# Patient Record
Sex: FEMALE | Race: WHITE | NOT HISPANIC OR LATINO | Employment: OTHER | URBAN - METROPOLITAN AREA
[De-identification: names, ages, dates, MRNs, and addresses within clinical notes are randomized per-mention and may not be internally consistent; named-entity substitution may affect disease eponyms.]

---

## 2019-11-27 ENCOUNTER — HOSPITAL ENCOUNTER (OUTPATIENT)
Dept: NEUROLOGY | Facility: CLINIC | Age: 69
Setting detail: THERAPIES SERIES
Discharge: STILL ADMITTED OR ADMINISTRATIVE | End: 2019-11-27
Attending: PSYCHOLOGIST

## 2019-11-27 DIAGNOSIS — F43.23 ADJUSTMENT DISORDER WITH MIXED ANXIETY AND DEPRESSED MOOD: ICD-10-CM

## 2020-01-24 ENCOUNTER — HOSPITAL ENCOUNTER (OUTPATIENT)
Dept: NEUROLOGY | Facility: CLINIC | Age: 70
Setting detail: THERAPIES SERIES
Discharge: STILL ADMITTED OR ADMINISTRATIVE | End: 2020-01-24
Attending: PSYCHOLOGIST

## 2020-01-24 DIAGNOSIS — F43.23 ADJUSTMENT DISORDER WITH MIXED ANXIETY AND DEPRESSED MOOD: ICD-10-CM

## 2020-02-05 ENCOUNTER — HOSPITAL ENCOUNTER (OUTPATIENT)
Dept: NEUROLOGY | Facility: CLINIC | Age: 70
Setting detail: THERAPIES SERIES
Discharge: STILL ADMITTED OR ADMINISTRATIVE | End: 2020-02-05
Attending: PSYCHOLOGIST

## 2020-02-05 DIAGNOSIS — F43.21 ADJUSTMENT DISORDER WITH DEPRESSED MOOD: ICD-10-CM

## 2020-03-10 ENCOUNTER — HOSPITAL ENCOUNTER (OUTPATIENT)
Dept: NEUROLOGY | Facility: CLINIC | Age: 70
Setting detail: THERAPIES SERIES
Discharge: STILL ADMITTED OR ADMINISTRATIVE | End: 2020-03-10
Attending: PSYCHOLOGIST

## 2020-03-10 DIAGNOSIS — F43.21 ADJUSTMENT DISORDER WITH DEPRESSED MOOD: ICD-10-CM

## 2020-03-31 ENCOUNTER — HOSPITAL ENCOUNTER (OUTPATIENT)
Dept: NEUROLOGY | Facility: CLINIC | Age: 70
Setting detail: THERAPIES SERIES
Discharge: STILL ADMITTED OR ADMINISTRATIVE | End: 2020-03-31
Attending: PSYCHOLOGIST

## 2020-03-31 DIAGNOSIS — F43.23 ADJUSTMENT DISORDER WITH MIXED ANXIETY AND DEPRESSED MOOD: ICD-10-CM

## 2020-04-08 ENCOUNTER — HOSPITAL ENCOUNTER (OUTPATIENT)
Dept: NEUROLOGY | Facility: CLINIC | Age: 70
Setting detail: THERAPIES SERIES
Discharge: STILL ADMITTED OR ADMINISTRATIVE | End: 2020-04-08
Attending: PSYCHOLOGIST

## 2020-04-08 DIAGNOSIS — F43.21 ADJUSTMENT DISORDER WITH DEPRESSED MOOD: ICD-10-CM

## 2020-04-22 ENCOUNTER — HOSPITAL ENCOUNTER (OUTPATIENT)
Dept: NEUROLOGY | Facility: CLINIC | Age: 70
Setting detail: THERAPIES SERIES
Discharge: STILL ADMITTED OR ADMINISTRATIVE | End: 2020-04-22
Attending: PSYCHOLOGIST

## 2020-04-22 DIAGNOSIS — F43.21 ADJUSTMENT DISORDER WITH DEPRESSED MOOD: ICD-10-CM

## 2020-05-12 ENCOUNTER — HOSPITAL ENCOUNTER (OUTPATIENT)
Dept: NEUROLOGY | Facility: CLINIC | Age: 70
Setting detail: THERAPIES SERIES
Discharge: STILL ADMITTED OR ADMINISTRATIVE | End: 2020-05-12
Attending: PSYCHOLOGIST

## 2020-05-12 DIAGNOSIS — F43.21 ADJUSTMENT DISORDER WITH DEPRESSED MOOD: ICD-10-CM

## 2020-05-26 ENCOUNTER — HOSPITAL ENCOUNTER (OUTPATIENT)
Dept: NEUROLOGY | Facility: CLINIC | Age: 70
Setting detail: THERAPIES SERIES
Discharge: STILL ADMITTED OR ADMINISTRATIVE | End: 2020-05-26
Attending: PSYCHOLOGIST

## 2020-05-26 DIAGNOSIS — F43.23 ADJUSTMENT DISORDER WITH MIXED ANXIETY AND DEPRESSED MOOD: ICD-10-CM

## 2020-06-09 ENCOUNTER — HOSPITAL ENCOUNTER (OUTPATIENT)
Dept: NEUROLOGY | Facility: CLINIC | Age: 70
Setting detail: THERAPIES SERIES
Discharge: STILL ADMITTED OR ADMINISTRATIVE | End: 2020-06-09
Attending: PSYCHOLOGIST

## 2020-06-09 DIAGNOSIS — F43.23 ADJUSTMENT DISORDER WITH MIXED ANXIETY AND DEPRESSED MOOD: ICD-10-CM

## 2020-06-24 ENCOUNTER — HOSPITAL ENCOUNTER (OUTPATIENT)
Dept: NEUROLOGY | Facility: CLINIC | Age: 70
Setting detail: THERAPIES SERIES
Discharge: STILL ADMITTED OR ADMINISTRATIVE | End: 2020-06-24
Attending: PSYCHOLOGIST

## 2020-06-24 DIAGNOSIS — F43.21 ADJUSTMENT DISORDER WITH DEPRESSED MOOD: ICD-10-CM

## 2020-07-17 ENCOUNTER — HOSPITAL ENCOUNTER (OUTPATIENT)
Dept: NEUROLOGY | Facility: CLINIC | Age: 70
Setting detail: THERAPIES SERIES
Discharge: STILL ADMITTED OR ADMINISTRATIVE | End: 2020-07-17
Attending: PSYCHOLOGIST

## 2020-07-17 DIAGNOSIS — F43.23 ADJUSTMENT DISORDER WITH MIXED ANXIETY AND DEPRESSED MOOD: ICD-10-CM

## 2020-08-07 ENCOUNTER — HOSPITAL ENCOUNTER (OUTPATIENT)
Dept: NEUROLOGY | Facility: CLINIC | Age: 70
Setting detail: THERAPIES SERIES
Discharge: STILL ADMITTED OR ADMINISTRATIVE | End: 2020-08-07
Attending: PSYCHOLOGIST

## 2020-08-07 DIAGNOSIS — F43.23 ADJUSTMENT DISORDER WITH MIXED ANXIETY AND DEPRESSED MOOD: ICD-10-CM

## 2020-08-21 ENCOUNTER — HOSPITAL ENCOUNTER (OUTPATIENT)
Dept: NEUROLOGY | Facility: CLINIC | Age: 70
Setting detail: THERAPIES SERIES
Discharge: STILL ADMITTED OR ADMINISTRATIVE | End: 2020-08-21
Attending: PSYCHOLOGIST

## 2020-08-21 DIAGNOSIS — F43.23 ADJUSTMENT DISORDER WITH MIXED ANXIETY AND DEPRESSED MOOD: ICD-10-CM

## 2020-09-04 ENCOUNTER — HOSPITAL ENCOUNTER (OUTPATIENT)
Dept: NEUROLOGY | Facility: CLINIC | Age: 70
Setting detail: THERAPIES SERIES
Discharge: STILL ADMITTED OR ADMINISTRATIVE | End: 2020-09-04
Attending: PSYCHOLOGIST

## 2020-09-04 DIAGNOSIS — F43.23 ADJUSTMENT DISORDER WITH MIXED ANXIETY AND DEPRESSED MOOD: ICD-10-CM

## 2020-09-18 ENCOUNTER — HOSPITAL ENCOUNTER (OUTPATIENT)
Dept: NEUROLOGY | Facility: CLINIC | Age: 70
Setting detail: THERAPIES SERIES
Discharge: STILL ADMITTED OR ADMINISTRATIVE | End: 2020-09-18
Attending: PSYCHOLOGIST

## 2020-09-18 DIAGNOSIS — F43.23 ADJUSTMENT DISORDER WITH MIXED ANXIETY AND DEPRESSED MOOD: ICD-10-CM

## 2020-10-02 ENCOUNTER — HOSPITAL ENCOUNTER (OUTPATIENT)
Dept: NEUROLOGY | Facility: CLINIC | Age: 70
Setting detail: THERAPIES SERIES
Discharge: STILL ADMITTED OR ADMINISTRATIVE | End: 2020-10-02
Attending: PSYCHOLOGIST

## 2020-10-02 DIAGNOSIS — F43.23 ADJUSTMENT DISORDER WITH MIXED ANXIETY AND DEPRESSED MOOD: ICD-10-CM

## 2020-10-16 ENCOUNTER — HOSPITAL ENCOUNTER (OUTPATIENT)
Dept: NEUROLOGY | Facility: CLINIC | Age: 70
Setting detail: THERAPIES SERIES
Discharge: STILL ADMITTED OR ADMINISTRATIVE | End: 2020-10-16
Attending: PSYCHOLOGIST

## 2020-10-16 DIAGNOSIS — F43.23 ADJUSTMENT DISORDER WITH MIXED ANXIETY AND DEPRESSED MOOD: ICD-10-CM

## 2020-10-30 ENCOUNTER — HOSPITAL ENCOUNTER (OUTPATIENT)
Dept: NEUROLOGY | Facility: CLINIC | Age: 70
Setting detail: THERAPIES SERIES
Discharge: STILL ADMITTED OR ADMINISTRATIVE | End: 2020-10-30
Attending: PSYCHOLOGIST

## 2020-10-30 DIAGNOSIS — F43.23 ADJUSTMENT DISORDER WITH MIXED ANXIETY AND DEPRESSED MOOD: ICD-10-CM

## 2021-06-02 ENCOUNTER — OFFICE VISIT - HEALTHEAST (OUTPATIENT)
Dept: NEUROLOGY | Facility: CLINIC | Age: 71
End: 2021-06-02

## 2021-06-02 DIAGNOSIS — F43.23 ADJUSTMENT DISORDER WITH MIXED ANXIETY AND DEPRESSED MOOD: ICD-10-CM
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
## 2021-06-05 NOTE — PROGRESS NOTES
"Psychology Progress Note    Date: 1/24/2020    Time length and type of treatment: 5861-6079 , individual therapy    Necessity: This session is necessary to address adjustment issues with features of depression secondary to caregiver role for her  with Alzheimer's disease.  We initiate patient's treatment plan today.  She rates her depression on the PHQ-9 depression severity scale as 2 indicating mild depression.  Patient rates her anxiety on the IRINA-7 anxiety severity scale of 0 indicating no anxiety.  Today we focus on the patient's depression treatment plan, specifically exploring processing grief and cognitive messages that exacerbate depression.  The reader is invited to review the patient's full treatment plan in the Media section of the patient's Epic medical record.    Intervention: Patient reports a sense that her  and she are arguing less.  She describes being more accepting that this is an ineffective strategy and that she will not be able to resolve the issues that she experienced during the course of her marriage now that her  has Alzheimer's.    We explore how the patient can balance her needs with her 's needs.  Patient indicates she is more comfortable with addressing some of her needs when her  will be in a day program more consistently.  She reports they have just returned from [place] and will be going to [place] for 3 weeks.  A more consistent program of adult day care will not start until after they get back.    We discussed how the patient might frame her relationship with her  in a way that would be more comfortable such as \"I am making a commitment to this human being to keep him safe.\"  We discussed how exercising ones values around kindness and caring can give meaning to this experience.    This writer utilized motivational interviewing, active listening, reassurance and support in the context of cognitive behavioral therapy and ACT therapy to " address the above.      Mental Status: Orientation to person, place, and time is in tact.  Recent and remote memory, attention, concentration, language, and fund of knowledge are intact.  Mood appears stable with calm affect.  No indication of suicidal ideation, plan, or intent.  No indication of homicidal ideation, plan or intent.    Progress: Patient feels that things are better now that her  and she are arguing less.  Progress is good with deepening acceptance of her situation.    Plan:   Patient will return in 2 weeks where we will continue with cognitive behavioral therapy to promote adaptive coping with her caregiver role for her  with Alzheimer's disease and support as the patient faces difficult decisions regarding her 's care.  Estimated duration of treatment is 4 individual therapy sessions (80126) at 2-week intervals.   it is anticipated that frequency will be reduced to 1 time per month for 4 sessions by 4/1/2024.  Treatment will be completed by 8/1/2020.  Further services are warranted to address caregiver stress which research has indicated is related to development of psychiatric and medical complications.  Patient was in agreement with this plan.  Diagnosis: Adjustment disorder with depressed mood

## 2021-06-06 NOTE — PROGRESS NOTES
Psychology Progress Note    Date: 3/10/2020    Time length and type of treatment: 8366-3470 , individual therapy    Necessity: This session is necessary to address adjustment issues and features of depression secondary to caregiver role for her  with Alzheimer's disease.  Today we focus on the patient's depression treatment plan, specifically exploring processing grief, thoughts and expectations of self and others, and cognitive messages that exacerbate depression.  The reader is invited to review the patient's full treatment plan in the Media section of the patient's Epic medical record.    Intervention: Patient reports being relieved to be back from a trip to [place].  She indicates that many of her expectations were not met.  She reports feeling somewhat more burdened with supervising her .    Patient reflects on the substantial resentment that she feels when her  is critical of her and never thanks her for her assistance.  We discussed how this has been reflected throughout her marriage.  We explore ways the patient might reframe her expectations at this time in order to reduce resentment.  Patient is able to identify phrases that she learned through mindfulness meditation training that might be helpful.    We also discussed resources in the community for her  and herself such as yoga classes and a pottery class for her .  Patient is encouraged to recognize that some activities will be for her, some for her  and some for both.  She is encouraged to recognize that some activities her  may enjoy more than others.    This writer utilized motivational interviewing, active listening, reassurance and support in the context of cognitive behavioral therapy and ACT therapy to address the above.      Mental Status: Orientation to person, place, and time is in tact.  Recent and remote memory, attention, concentration, language, and fund of knowledge are intact.  Mood  appears depressed with flat affect.  No indication of suicidal ideation, plan, or intent.  No indication of homicidal ideation, plan or intent.    Progress: Patient reports feeling somewhat depleted following vacation in warmer climate.  Progress is slow with establishing sufficient activities for her  and herself that are meaningful and represent a weekly routine.    Plan:    Patient will return in 3 weeks where we will continue with cognitive behavioral therapy to promote adaptive coping with her caregiver role for her  with Alzheimer's disease and support as the patient faces difficult decisions regarding her 's care.  Patient reports she would like to invite her two adult children to one session for educational purposes.  Estimated duration of treatment is 4 individual therapy sessions (10157) at 2-week intervals.   it is anticipated that frequency will be reduced to 1 time per month for 4 sessions by 5/1/2020.  Treatment will be completed by 8/1/2020.  Further services are warranted to address caregiver stress which research has indicated is related to development of psychiatric and medical complications.  Patient was in agreement with this plan.  Diagnosis: Adjustment disorder with depressed mood

## 2021-06-07 NOTE — PROGRESS NOTES
Psychology Progress Note    Date: 3/31/2020    Time length and type of treatment: 7071-1671 , individual therapy    Necessity: This session is necessary to address adjustment issues and features of depression secondary to caregiver role for her  with Alzheimer's disease.  Today we focus on the patient's depression treatment plan, specifically exploring thoughts and expectations of self and others.  The reader is invited to review the patient's full treatment plan in the Media section of the patient's Epic medical record. After review of the patient's situation, this visit was changed from an in-person visit to a telephone visit to reduce the risk of COVID 19 exposure.  Patient was informed that policies and procedures that govern in-person sessions would also apply to phone sessions.  Patient was also informed that phone sessions would be discontinued when COVID 19 exposure is no longer a concern (as determined by [facility]).  Patient was in agreement with proceeding with a phone session.    Intervention: Patient reports managing the COVID crisis reasonably well overall.  She describes the challenges in finding ways to structure her 's time.  She describes ongoing challenges with her 's chronic difficult personality features which have been present throughout their marriage.  She reports a variety of restorative activities for herself on a daily basis.    Patient indicates that her  now needs more supervision with bathroom hygiene.  She reports being generally comfortable providing this.  Her 's psychiatrist has encouraged her to begin research to find a [facility].  Patient would rather try more frequent day programming (4 days per week) first when these programs resume.      We discuss how the patient can monitor her feelings and reactions with trying to structure activities for her  before he can begin a day program.  Given that we don't know how long it will be before  day programs resume she is encouraged to do the research and virtual visits with [facility].    Patient reflects on her anxiety about pauline COVID 19 herself.  She reports knowing what symptoms to look for.  This writer encourages her to go on to MY Chart for her healthcare system to see what steps they are recommending for patients who have concerns.  We discussed how having a plan and knowing resources can help with the anxiety.    Patient also describes giving herself permission to take things slower each day e.g. breakfast with coffee and newspaper, lowering expectations for what she accomplishes in a day.    This writer utilized motivational interviewing, active listening, reassurance and support in the context of cognitive behavioral therapy and ACT therapy to address the above.      Mental Status: Orientation to person, place, and time is in tact.  Recent and remote memory, attention, concentration, language, and fund of knowledge are intact.  Mood appears stable with calm affect.  No indication of suicidal ideation, plan, or intent.  No indication of homicidal ideation, plan or intent.    Progress: Patient reports adapting well overall to COVID 19 situation.  Progress is improving with modifying expectations of her  and herself.    Plan: Patient will return in 2 weeks where we will continue with cognitive behavioral therapy to promote adaptive coping with her caregiver role for her  with Alzheimer's disease and support as the patient faces difficult decisions regarding her 's care.  Estimated duration of treatment is 4 individual therapy sessions (32186) at 2 week intervals.  The goal is to work towards an interval of one session per month by 6/1/2020.  Treatment is expected to be completed by 9/1/2020.  Further services are warranted to address caregiver stress which research has indicated is related to development of psychiatric and medical complications.  Patient was in agreement with  this plan.    Diagnosis:  Adjustment disorder with depressed mood.

## 2021-06-07 NOTE — PROGRESS NOTES
Psychology Progress Note    Date: 4/8/2020    Time length and type of treatment: 9624-0520 , individual therapy, phone visit    Necessity: This session is necessary to address adjustment issues with features of depression secondary to caregiver role for her  with Alzheimer's.  Today we focus on the patient's depression treatment plan, specifically exploring thoughts and expectations of self and others.  The reader is invited to review the patient's full treatment plan in the Media section of the patient's Epic medical record.     After review of the patient's situation, this visit was changed from an in-person visit to a phone visit to reduce the risk of COVID 19 exposure.  Patient was informed that policies and procedures that govern in-person sessions would also apply to phone sessions.  Patient was also informed that phone sessions would be discontinued when COVID 19 exposure is no longer a concern (as determined by [facility]).      Patient location: home.  Provider location: private residence ([facility], [facility] outpatient mental health services).      Patient was in agreement with proceeding with a phone session.    Intervention: Patient reports coping as best as she can with the constraints that shelter in place have placed upon her.  She describes taking a brisk walk daily by herself in addition to a slow walk with her .  She reports that this has been so helpful that she is sleeping better at night.      We discussed the reality that adult day programs may not resume until July.  We explored how well the patient would be able to tolerate 24/7 care for her  given the level of supervision that he now needs (brushing teeth, general hygiene, structured activity.)  Patient acknowledges that he was unable to remember that she had gone out for a walk and went to look for her getting lost.  He did find his way back home.  We discuss strategies to help with this  situation.    We also discuss that because of the COVID 19 crisis it may accelerate her plan to place her  in [facility] as undesirable as that it is.  We explore the patient's thoughts and feelings about that.     Patient reports doing a better job of meal planning so that she feels safer that she has adequate supplies.    This writer utilized motivational interviewing, active listening, reassurance and support in the context of cognitive behavioral therapy and ACT therapy to address the above.      Mental Status: Orientation to person, place, and time is in tact.  Recent and remote memory, attention, concentration, language, and fund of knowledge are intact.  Mood appears stable with calm affect.  No indication of suicidal ideation, plan, or intent.  No indication of homicidal ideation, plan or intent.    Progress: Patient reports coping as best as she can with the constraints secondary to COVID 19.  Progress is good with adopting effective self care measures.    Plan: Patient will return in 2 weeks where we will continue with cognitive behavioral therapy to promote adaptive coping with her caregiver role for her  with Alzheimer's disease and support as the patient faces difficult decisions regarding her 's care.  Estimated duration of treatment is 4 individual therapy sessions (11561) at 2 week intervals.  The goal is to work towards an interval of one session per month by 6/1/2020.  Treatment is expected to be completed by 9/1/2020.  Further services are warranted to address caregiver stress which research has indicated is related to development of psychiatric and medical complications.  Patient was in agreement with this plan.    Diagnosis:  Adjustment disorder with depressed mood

## 2021-06-07 NOTE — PROGRESS NOTES
Psychology Progress Note    Date: 4/22/2020    Time length and type of treatment: 7106-4379 , individual therapy, telephone visit    Necessity: This session is necessary to address ajdustment issues with features of depression secondary to caregiver role for her  with Alzheimer's.  Today we focus on the patient's depression treatment plan, specifically exploring thoughts and expectations of self and others.  The reader is invited to review the patient's full treatment plan in the Media section of the patient's Epic medical record.     After review of the patient's situation, this visit was changed from an in-person visit to a telephone visit via to reduce the risk of COVID 19 exposure.  Patient was given the option of a video visit.  Patient declined indicating preference for phone session.  Patient was informed that policies and procedures that govern in-person sessions would also apply to telephone sessions.  Patient was also informed that telephone sessions would be discontinued when COVID 19 exposure is no longer a concern (as determined by [facility]).       Patient distance location: home  Provider distance location: [facility] [place]    Patient was in agreement with proceeding with a telephone session.    Intervention: Patient reports having some challenging days feeling hopelessness.  We discuss how the patient's history of a difficult relationship with her  makes caring for him now especially draining.  Also, the constraints imposed by the pandemic is particularly debilitating.      Patient describes her 's behaviors which are particularly challenging, not taking instruction from her where there are safety concerns.  We explore a variety of strategies for building trust between them - statements of reassurance (consistently and frequently)  that she is not going anywhere and will take care of him; doing portraits of him for  "their 2 children.  Patient finds this discussion helpful.     This writer utilized motivational interviewing, active listening, reassurance and support in the context of cognitive behavioral therapy and ACT therapy to address the above.      Mental Status: Orientation to person, place, and time is in tact.  Recent and remote memory, attention, concentration, language, and fund of knowledge are intact.  Mood appears stable with calm affect.  No indication of suicidal ideation, plan, or intent.  No indication of homicidal ideation, plan or intent.    Progress: Patient reports doing well \"on most days\".  We discuss how she can contact this writer when she feels that she needs to talk sooner.  Progress is good with maintaining stable mood overall.    Plan:   Patient will return in 2 weeks where we will continue with cognitive behavioral therapy to promote adaptive coping with her caregiver role for her  with Alzheimer's disease and support as the patient faces difficult decisions regarding her 's care.  Estimated duration of treatment is 4 individual therapy sessions (14225) at 2 week intervals.  The goal is to work towards an interval of one session per month by 7/1/2020.  Treatment is expected to be completed by 9/1/2020.  Further services are warranted to address caregiver stress which research has indicated is related to development of psychiatric and medical complications.  Patient was in agreement with this plan.    Diagnosis:  Adjustment disorder with depressed mood  "

## 2021-06-08 NOTE — PROGRESS NOTES
"Psychology Progress Note    Date: 6/9/2020     Time length and type of treatment: 5227-4787 , individual therapy, telephone visit    Necessity: This session is necessary to address ajdustment issues with features of depression secondary to caregiver role for her  with Alzheimer's.  Today we focus on the patient's depression treatment plan, specifically exploring thoughts and expectations of self and others and processing grief.  The reader is invited to review the patient's full treatment plan in the Media section of the patient's Epic medical record.    After review of the patient's situation, this visit was changed from an in-person visit to a telephone visit via [employer]  to reduce the risk of COVID 19 exposure.  Patient was given the option of a video visit.  Patient declined indicating preference for phone session.  Patient was informed that policies and procedures that govern in-person sessions would also apply to telephone sessions.  Patient was also informed that telephone sessions would be discontinued when COVID 19 exposure is no longer a concern (as determined by [facility]).       Patient distance location: home  Provider distance location: [facility] [place]     Patient was in agreement with proceeding with a telephone session.      Intervention: Patient reports \"I am probably at my lowest point.\"  She describes making the decision not to purchase a new home and not sell her current home.  We discuss how this had served as a balm for her coping with the 24 hour supervision that her  requires.      We explore ways that the patient might get more breaks going forward knowing that day programs will not be coming back on line anytime soon.  We discuss how the patient might find more activities for her  to do (browse through the [employer]) and ask others to spend consistent time with him on a weekly basis so that " she gets a break.    This writer utilized motivational interviewing, active listening, reassurance and support in the context of cognitive behavioral therapy and ACT therapy to address the above.      Mental Status: Orientation to person, place, and time is in tact.  Recent and remote memory, attention, concentration, language, and fund of knowledge are intact.  Mood appears stable with sad affect.  No indication of suicidal ideation, plan, or intent.  No indication of homicidal ideation, plan or intent.    Progress: Patient reports feeling very discouraged.  Progress is slow with acceptance that the burden of structuring her 's day falls on her and that day programming may not resume.    Plan: Patient will return in 2 weeks where we will continue with cognitive behavioral therapy to promote adaptive coping with her caregiver role for her  with Alzheimer's disease and support as the patient faces difficult decisions regarding her 's care.  Estimated duration of treatment is 3 individual therapy sessions (95013) at 2 week intervals.  The goal is to work towards an interval of one session per month by 7/1/2020.  Treatment is expected to be completed by 9/1/2020.  Further services are warranted to address caregiver stress which research has indicated is related to development of psychiatric and medical complications.  Patient was in agreement with this plan.    Diagnosis:  Adjustment disorder with depressed and anxious mood

## 2021-06-08 NOTE — PROGRESS NOTES
"Psychology Progress Note    Date: 5/12/2020    Time length and type of treatment: 8176-3015 , individual therapy, telephone visit    Necessity: This session is necessary to address ajdustment issues with features of depression secondary to caregiver role for her  with Alzheimer's.  Today we focus on the patient's depression treatment plan, specifically exploring thoughts and expectations of self and others.  The reader is invited to review the patient's full treatment plan in the Media section of the patient's Epic medical record.      After review of the patient's situation, this visit was changed from an in-person visit to a telephone visit via [employer]  to reduce the risk of COVID 19 exposure.  Patient was given the option of a video visit.  Patient declined indicating preference for phone session.  Patient was informed that policies and procedures that govern in-person sessions would also apply to telephone sessions.  Patient was also informed that telephone sessions would be discontinued when COVID 19 exposure is no longer a concern (as determined by [facility]).       Patient distance location: home  Provider distance location: [facility] Neurology [place]/[facility]     Patient was in agreement with proceeding with a telephone session.    Intervention: Patient reports doing the same.  She states, \"I really need adult day care to start again so that he can go 3 days per week.\"  She is pleased that her  will be meeting with a speech and occupational therapist for an assessment.  She expresses the hope that they will have tips for activities that she might do with her .  She is also looking forward [place] with her daughter and grandchildren (who will be in another cabin).  We discuss how these events help the patient tolerate the wait until adult day programs resume.      Patient reports that her 's " medications were increased slightly and seem to be helping with his level of agitation.    This writer utilized motivational interviewing, active listening, reassurance and support in the context of cognitive behavioral therapy and ACT therapy to address the above.      Mental Status: Orientation to person, place, and time is in tact.  Recent and remote memory, attention, concentration, language, and fund of knowledge are intact.  Mood appears stable with calm affect.  No indication of suicidal ideation, plan, or intent.  No indication of homicidal ideation, plan or intent.    Progress: Patient reports coping the best that she can.  Progress is good with maintaining stable mood.    Plan: Patient will return in 2 weeks where we will continue with cognitive behavioral therapy to promote adaptive coping with her caregiver role for her  with Alzheimer's disease and support as the patient faces difficult decisions regarding her 's care.  Estimated duration of treatment is 3 individual therapy sessions (12009) at 2 week intervals.  The goal is to work towards an interval of one session per month by 7/1/2020.  Treatment is expected to be completed by 9/1/2020.  Further services are warranted to address caregiver stress which research has indicated is related to development of psychiatric and medical complications.  Patient was in agreement with this plan.    Diagnosis:  Adjustment disorder with depressed mood

## 2021-06-08 NOTE — PROGRESS NOTES
"Psychology Progress Note    Date: 5/26/2020    Time length and type of treatment: 0988-7747 , individual therapy, telephone visit    Necessity: This session is necessary to address ajdustment issues with features of depression secondary to caregiver role for her  with Alzheimer's.  Today we focus on the patient's depression treatment plan, specifically exploring thoughts and expectations of self and others and processing grief.  The reader is invited to review the patient's full treatment plan in the Media section of the patient's Epic medical record.      After review of the patient's situation, this visit was changed from an in-person visit to a telephone visit via [employer]  to reduce the risk of COVID 19 exposure.  Patient was given the option of a video visit.  Patient declined indicating preference for phone session.  Patient was informed that policies and procedures that govern in-person sessions would also apply to telephone sessions.  Patient was also informed that telephone sessions would be discontinued when COVID 19 exposure is no longer a concern (as determined by [facility]).       Patient distance location: home  Provider distance location: [facility] Neurology [place]/[facility]     Patient was in agreement with proceeding with a telephone session.      Intervention: Patient reports significant disappointment that her trip to a cabin will be cancelled.  She indicates that her daughter did not think that it would be feasible to maintain social distancing.  Patient reflects on her daughter's comment that she is \"high maintenance\".  Patient acknowledges that she may have \"romanticized\" what the vacation would be.  We also explore that the patient has little reserve secondary to grueling nature of her caregiver responsibilities because of her 's behavioral issues.  We discuss how the small things that are daily are the best ways of replenishing.  "     Patient also reports that her stress levels have increased due to a decision to buy a new home and get her current home ready for market.  This writer cautions the patient to be realistic about how much her  will be able to help.  We also revisit ways to structure activities for her  in order to promote pleasant interactions.      This writer utilized motivational interviewing, active listening, reassurance and support in the context of cognitive behavioral therapy and ACT therapy to address the above.      Mental Status: Orientation to person, place, and time is in tact.  Recent and remote memory, attention, concentration, language, and fund of knowledge are intact.  Mood appears anxious with anxious affect.  No indication of suicidal ideation, plan, or intent.  No indication of homicidal ideation, plan or intent.    Progress: Patient reports profound disappointment that a vacation has been cancelled.  Progress is steady with managing expectations of her .    Plan: Patient will return in 2 weeks where we will continue with cognitive behavioral therapy to promote adaptive coping with her caregiver role for her  with Alzheimer's disease and support as the patient faces difficult decisions regarding her 's care.  Estimated duration of treatment is 3 individual therapy sessions (86027) at 2 week intervals.  The goal is to work towards an interval of one session per month by 7/1/2020.  Treatment is expected to be completed by 9/1/2020.  Further services are warranted to address caregiver stress which research has indicated is related to development of psychiatric and medical complications.  Patient was in agreement with this plan.    Diagnosis:  Adjustment disorder with depressed and anxious mood

## 2021-06-09 NOTE — PROGRESS NOTES
"Psychology Progress Note    Date: 7/17/2020    Time length and type of treatment: 4304-2498, individual therapy, telephone visit    Necessity: This session is necessary to address adjustment issues with features of depression secondary to caregiver role for her  with Alzheimer's.  We update the patient's treatment plan today. The patient rates her anxiety on the IRINA-7 as 7 indicating moderate anxiety.  The patient rates her depression on the PHQ-9 as 6 indicating moderate depression. Patient gives verbal consent to her treatment plan which we discuss.  Verbal consent is in lieu of a signature secondary to the nature of a telephone visit.  Today we focus on the patient's depression and anxiety treatment plan, specifically exploring thoughts and expectations of self and others and processing grief.  The reader is invited to review the patient's full treatment plan in the Media section of the patient's Epic medical record.  After review of the patient's situation, this visit was changed from an in-person visit to a telephone visit via [employer]  to reduce the risk of COVID 19 exposure.  Patient was given the option of a video visit.  Patient declined indicating preference for phone session.  Patient was informed that policies and procedures that govern in-person sessions would also apply to telephone sessions.  Patient was also informed that telephone sessions would be discontinued when COVID 19 exposure is no longer a concern (as determined by [facility]).       Patient distance location: home  Provider distance location: [facility] Neurology [place]/[facility]     Patient was in agreement with proceeding with a telephone session.    Intervention: Patient reports feeling more anxiety, resentment and negativity.  \"I feel trapped.\"  We explore what keeps the patient stuck in resentment given that she accepts that her  is not cognitively able to behave any " differently.  Patient reflects on the circumstances in which they met and got together.  She reports she was  when she began an affair with her current .  She describes significant regrets regarding many choices that she made when she was a young adult.  We explore that the patient's resentment and negativity may be towards herself.  We discuss how she might move towards more compassion towards her younger self.  This writer encourages the patient to journal her story so as to work towards developing more compassion and understanding towards herself.  She is in agreement with this plan.    Patient reports that efforts to create separate living spaces are stalled at this time due to changes that would be required.    This writer utilized motivational interviewing, active listening, reassurance and support in the context of cognitive behavioral therapy and ACT therapy to address the above.      Mental Status: Orientation to person, place, and time is in tact.  Recent and remote memory, attention, concentration, language, and fund of knowledge are intact.  Mood appears depressed with tearful affect.  No indication of suicidal ideation, plan, or intent.  No indication of homicidal ideation, plan or intent.    Progress: Patient reports feeling more trapped, resentful, anxious.  Progress is slow with stabilizing mood and deepening acceptance of providing care for her .    Plan: Patient will return in 2 weeks where we will continue with cognitive behavioral therapy to promote adaptive coping with her caregiver role for her  with Alzheimer's disease and support as the patient faces difficult decisions regarding her 's care.  Estimated duration of treatment is 8 individual therapy sessions (19882) at 2 week intervals.  Treatment is expected to be completed by 12/31/2020.  Further services are warranted to address caregiver stress which research has indicated is related to development of  psychiatric and medical complications.  Patient was in agreement with this plan.    Diagnosis:  Adjustment disorder with depressed and anxious mood
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
## 2021-06-10 NOTE — PROGRESS NOTES
Psychology Progress Note    Date: 8/21/2020    Time length and type of treatment: 0673-8814 , individual therapy, telephone visit    Necessity: This session is necessary to address adjustment issues with features of depression secondary to caregiver role for her  with Alzheimer's.  Today we focus on the patient's depression and anxiety treatment plan, specifically exploring thoughts and expectations of self and others and processing grief.  The reader is invited to review the patient's full treatment plan in the Media section of the patient's Epic medical record.    After review of the patient's situation, this visit was changed from an in-person visit to a telephone visit via [employer]  to reduce the risk of COVID 19 exposure.  Patient was given the option of a video visit.  Patient declined indicating preference for phone session.  Patient was informed that policies and procedures that govern in-person sessions would also apply to telephone sessions.  Patient was also informed that telephone sessions would be discontinued when COVID 19 exposure is no longer a concern (as determined by [facility]).       Patient distance location: home  Provider distance location: [facility] Neurology [place]/[facility]     Patient was in agreement with proceeding with a telephone session.    Intervention: Patient reports continuing to do better overall.  She describes her recurring frustration and distress at aspects of care giving for her .  We explore how she might see her  from a more balanced perspective.  Patient laments the limited support that she can expect from her adult daughters.  She acknowledges that she needs to get the support regarding her care giving distress from other sources.  Patient reflects on how her children see their father in a very different way than how she sees him.    Patient appreciates that her 's med changes currently are  "working and that he is more mellow and less needy.  Patient also reports feeling grateful that she is sleeping better.    Patient describes a scenario in which she is \"in the middle\" (between her daughter and a friend of the patient) which is very challenging for her.  We explore how this taps into a strenuous need to \"keep everyone happy.\"  We discuss a strategy allowing the patient to move forward.    This writer utilized motivational interviewing, active listening, reassurance and support in the context of cognitive behavioral therapy and ACT therapy to address the above.      Mental Status: Orientation to person, place, and time is in tact.  Recent and remote memory, attention, concentration, language, and fund of knowledge are intact.  Mood appears stable with calm affect.  No indication of suicidal ideation, plan, or intent.  No indication of homicidal ideation, plan or intent.    Progress: Patient reports doing better overall.  Progress is good with maintaining stable mood.    Plan:  Patient will return in 2 weeks where we will continue with cognitive behavioral therapy to promote adaptive coping with her caregiver role for her  with Alzheimer's disease and support as the patient faces difficult decisions regarding her 's care.  Estimated duration of treatment is 8 individual therapy sessions (90943) at 2 week intervals.  Treatment is expected to be completed by 12/31/2020.  Further services are warranted to address caregiver stress which research has indicated is related to development of psychiatric and medical complications.  Patient was in agreement with this plan.    Diagnosis:  Adjustment disorder with depressed and anxious mood  "

## 2021-06-10 NOTE — PROGRESS NOTES
"Psychology Progress Note    Date: 8/7/2020    Time length and type of treatment: 6479-7966 , individual therapy, telephone visit    Necessity: This session is necessary to address adjustment issues with features of depression secondary to caregiver role for her  with Alzheimer's.  Today we focus on the patient's depression and anxiety treatment plan, specifically exploring thoughts and expectations of self and others and processing grief.  The reader is invited to review the patient's full treatment plan in the Media section of the patient's Epic medical record.    After review of the patient's situation, this visit was changed from an in-person visit to a telephone visit via [employer]  to reduce the risk of COVID 19 exposure.  Patient was given the option of a video visit.  Patient declined indicating preference for phone session.  Patient was informed that policies and procedures that govern in-person sessions would also apply to telephone sessions.  Patient was also informed that telephone sessions would be discontinued when COVID 19 exposure is no longer a concern (as determined by [facility]).       Patient distance location: home  Provider distance location: [facility] Neurology [place]/[facility]     Patient was in agreement with proceeding with a telephone session.    Intervention: Patient reports doing \"pretty well\" today. She attributes this to getting good results from a recent colonoscopy.  Also, the patient reports being able to \"let go\" of harsh judgement of herself discussed in our last session as a result of being able to talk about it.    Patient indicates that her  has been more calm recently which helps.  She attributes this to his medication working well at this time.  She indicates that she continues to walk daily and have phone or zoom contact with friends on a regular basis.    We discuss that the patient finds these sessions helpful " because she tends to engage in impression management with friends and is more honest about her thoughts and feelings with this writer and PCP.  She would like to continue.    This writer encourages the patient to think about things that she might like to do during the winter and order anything that she might need.    This writer utilized motivational interviewing, active listening, reassurance and support in the context of cognitive behavioral therapy and ACT therapy to address the above.      Mental Status: Orientation to person, place, and time is in tact.  Recent and remote memory, attention, concentration, language, and fund of knowledge are intact.  Mood appears stable with calm affect.  No indication of suicidal ideation, plan, or intent.  No indication of homicidal ideation, plan or intent.    Progress: Patient reports doing better today.  Progress is good with stabilizing mood and reducing feelings of anger and resentment.    Plan: : Patient will return in 2 weeks where we will continue with cognitive behavioral therapy to promote adaptive coping with her caregiver role for her  with Alzheimer's disease and support as the patient faces difficult decisions regarding her 's care.  Estimated duration of treatment is 8 individual therapy sessions (52313) at 2 week intervals.  Treatment is expected to be completed by 12/31/2020.  Further services are warranted to address caregiver stress which research has indicated is related to development of psychiatric and medical complications.  Patient was in agreement with this plan.    Diagnosis:  Adjustment disorder with depressed and anxious mood

## 2021-06-11 NOTE — PROGRESS NOTES
"Psychology Progress Note    Date: 9/18/2020    Time length and type of treatment: 2061-3036 , individual therapy, telephone visit    Necessity: This session is necessary to address adjustment issues with features of depression secondary to caregiver role for her  with Alzheimer's.  Today we focus on the patient's depression and anxiety treatment plan, specifically exploring thoughts and expectations of self and others and processing grief.  The reader is invited to review the patient's full treatment plan in the Media section of the patient's Epic medical record.    After review of the patient's situation, this visit was changed from an in-person visit to a telephone visit via [employer]  to reduce the risk of COVID 19 exposure.  Patient was given the option of a video visit.  Patient declined indicating preference for phone session.  Patient was informed that policies and procedures that govern in-person sessions would also apply to telephone sessions.  Patient was also informed that telephone sessions would be discontinued when COVID 19 exposure is no longer a concern (as determined by [facility]).       Patient distance location: home  Provider distance location: [facility] [place]/[facility]     Patient was in agreement with proceeding with a telephone session.    Intervention: Patient reports doing \"OK\".  Patient reflects on how difficult it is to engage in day-to-day activity that feels meaningful.  Patient describes a variety of scenarios in how to set up the house so that she can have more privacy and space for creative outlets.  We explore how preoccupation with space and money is sapping her energy.  Patient reveals insight \"Maybe I need to go back to [facility]\".   Patient indicates that a friend with whom she went to [facility] will helpful to her to support her efforts to \"let go\".  \"When we hang up I am going to call her.\"    This writer utilized " motivational interviewing, active listening, reassurance and support in the context of cognitive behavioral therapy and ACT therapy to address the above.      Mental Status: Orientation to person, place, and time is in tact.  Recent and remote memory, attention, concentration, language, and fund of knowledge are intact.  Mood appears stable with calm affect.  No indication of suicidal ideation, plan, or intent.  No indication of homicidal ideation, plan or intent.    Progress: Patient reports having good days and bad days.  Progress is good with maintaining stable mood.    Plan:Patient will return in 2 weeks where we will continue with cognitive behavioral therapy to promote adaptive coping with her caregiver role for her  with Alzheimer's disease and support as the patient faces difficult decisions regarding her 's care.  Estimated duration of treatment is 8 individual therapy sessions (70734) at 2 week intervals.  Treatment is expected to be completed by 12/31/2020.  Further services are warranted to address caregiver stress which research has indicated is related to development of psychiatric and medical complications.  Patient was in agreement with this plan.    Diagnosis:  Adjustment disorder with depressed and anxious mood

## 2021-06-11 NOTE — PROGRESS NOTES
"Psychology Progress Note    Date: 9/4/2020    Time length and type of treatment: 3352-6117 , individual therapy, telephone visit    Necessity:  This session is necessary to address adjustment issues with features of depression secondary to caregiver role for her  with Alzheimer's.  Today we focus on the patient's depression and anxiety treatment plan, specifically exploring thoughts and expectations of self and others and processing grief.  The reader is invited to review the patient's full treatment plan in the Media section of the patient's Epic medical record.    After review of the patient's situation, this visit was changed from an in-person visit to a telephone visit via [employer]  to reduce the risk of COVID 19 exposure.  Patient was given the option of a video visit.  Patient declined indicating preference for phone session.  Patient was informed that policies and procedures that govern in-person sessions would also apply to telephone sessions.  Patient was also informed that telephone sessions would be discontinued when COVID 19 exposure is no longer a concern (as determined by [facility]).       Patient distance location: home  Provider distance location: [facility] [place]/[facility]     Patient was in agreement with proceeding with a telephone session.    Intervention: Patient reports \"doing pretty well.\"  She indicates that the visit with her daughter went well.  In particular, patient reports obtaining more support from her daughter regarding the challenges of care giving for the patient's .  Patient describes this as significant comfort.      We discussed strategies that the patient might need to thinking about for coping with winter months.  Patient reports starting to give some thought to this.      This writer utilized motivational interviewing, active listening, reassurance and support in the context of cognitive behavioral " "therapy and ACT therapy to address the above.      Mental Status: Orientation to person, place, and time is in tact.  Recent and remote memory, attention, concentration, language, and fund of knowledge are intact.  Mood appears stable with calm affect.  No indication of suicidal ideation, plan, or intent.  No indication of homicidal ideation, plan or intent.    Progress: Patient reports \"doing pretty well today.\"  Progress is good with maintaining stable mood.    Plan: Patient will return in 2 weeks where we will continue with cognitive behavioral therapy to promote adaptive coping with her caregiver role for her  with Alzheimer's disease and support as the patient faces difficult decisions regarding her 's care.  Estimated duration of treatment is 8 individual therapy sessions (36125) at 2 week intervals.  Treatment is expected to be completed by 12/31/2020.  Further services are warranted to address caregiver stress which research has indicated is related to development of psychiatric and medical complications.  Patient was in agreement with this plan.    Diagnosis:  Adjustment disorder with depressed and anxious mood  "

## 2021-06-12 NOTE — PROGRESS NOTES
"Psychology Progress Note    Date: 10/2/2020    Time length and type of treatment: 1073-5145 , individual therapy, telephone visit    Necessity: This session is necessary to address adjustment issues with features of depression secondary to caregiver role for her  with Alzheimer's.  Today we focus on the patient's depression and anxiety treatment plan, specifically exploring thoughts and expectations of self and others and processing grief.  The reader is invited to review the patient's full treatment plan in the Media section of the patient's Epic medical record.    After review of the patient's situation, this visit was changed from an in-person visit to a telephone visit via [employer]  to reduce the risk of COVID 19 exposure.  Patient was given the option of a video visit.  Patient declined indicating preference for phone session.  Patient was informed that policies and procedures that govern in-person sessions would also apply to telephone sessions.  Patient was also informed that telephone sessions would be discontinued when COVID 19 exposure is no longer a concern (as determined by [facility]).       Patient distance location: home  Provider distance location: [facility] [place]/[facility]     Patient was in agreement with proceeding with a telephone session.    Intervention: Patient reports doing \"OK\".  She describes being busy which is mood stabilizing for her.    Patient reports that she had the opportunity to meet with her friend whom she knows through [employer].  She describes this as helpful.    Patient reports making progress with \"letting go\" of financial concerns.  Regarding space in the home, patient reports feeling more settled about what her options are.    Patient reports satisfaction with her current plan of managing her 's care at home through the winter with starting a day program next spring/summer.  Patient hopes to postpone " "placement in residential care until her  has advanced to the point where placement in long term care nursing home setting would be necessary.    This writer utilized motivational interviewing, active listening, reassurance and support in the context of cognitive behavioral therapy and ACT therapy to address the above.      Mental Status: Orientation to person, place, and time is in tact.  Recent and remote memory, attention, concentration, language, and fund of knowledge are intact.  Mood appears stable with calm affect.  No indication of suicidal ideation, plan, or intent.  No indication of homicidal ideation, plan or intent.    Progress: Patient reports doing \"OK\".  Progress is good with stabilizing mood and letting go of a variety of issues.     Plan: Patient will return in 2 weeks where we will continue with cognitive behavioral therapy to promote adaptive coping with her caregiver role for her  with Alzheimer's disease and support as the patient faces difficult decisions regarding her 's care.  Estimated duration of treatment is 6 individual therapy sessions (50324) at 2 week intervals.  Treatment is expected to be completed by 12/31/2020.  Further services are warranted to address caregiver stress which research has indicated is related to development of psychiatric and medical complications.  Patient was in agreement with this plan.    Diagnosis:  Adjustment disorder with depressed and anxious mood  "

## 2021-06-12 NOTE — PROGRESS NOTES
"Psychology Progress Note    Date: 10/30/2020    Time length and type of treatment: 5831-1390 , individual therapy, telephone visit    Necessity: This session is necessary to address adjustment issues with features of depression and anxiety secondary to caregiver role for her  with Alzheimer's.  We update the patient's treatment plan today.  She rates her anxiety as 1 (out of 21) indicating mild anxiety.  This represents an improvement over previous rating of 7, moderate anxiety. Patient rates her depression on the PHQ-9 scale as 5 (out of 27) indicating mild depression.  This is comparable to the patient's previous rating of 6, mild depression. Patient gives verbal consent to her treatment plan which we discuss.  Verbal consent is in lieu of a signature secondary to a virtual visit.  Today we focus on the patient's depression and anxiety treatment plan, specifically exploring thoughts and expectations of self and others and processing grief.  The reader is invited to review the patient's full treatment plan in the Media section of the patient's Epic medical record.    After review of the patient's situation, this visit was changed from an in-person visit to a telephone visit via [employer]  to reduce the risk of COVID 19 exposure.  Patient was given the option of a video visit.  Patient declined indicating preference for phone session.  Patient was informed that policies and procedures that govern in-person sessions would also apply to telephone sessions.  Patient was also informed that telephone sessions would be discontinued when COVID 19 exposure is no longer a concern (as determined by [facility]).       Patient distance location: home  Provider distance location: [facility] [place]/[facility]     Patient was in agreement with proceeding with a telephone session.      Intervention: \"We have decided to take off for [place] for 3.5 months at the end of " "November.\"  Patient feels that this is the best way to cope with COVID, winter, and Alzheimer's.  Patient reports that this would be better for care giving because they have friends who would help with her 's care and would be able to be outdoors.    Patient reports that planning for the trip and where they would stay has helped her mood.  Patient also reports needing to find a new car which \"gives me an assignment to do which helps my mood\".  Patient will return in the middle of March.    Patient reports learning that meeting with this writer \"will be accommodated\" by her insurance ([employer]).    This writer utilized motivational interviewing, active listening, reassurance and support in the context of cognitive behavioral therapy and ACT therapy to address the above.      Mental Status: Orientation to person, place, and time is in tact.  Recent and remote memory, attention, concentration, language, and fund of knowledge are intact.  Mood appears stable with calm affect.  No indication of suicidal ideation, plan, or intent.  No indication of homicidal ideation, plan or intent.    Progress: Patient reports much improved mood with decision to go to [place].  Progress is good with stabilizing mood.    Plan: We mutually agree to end psychology services at this time.  Patient was advised on how to access services should the need arise when she returns.     Diagnosis:  Adjustment disorder with depressed and anxious mood  "

## 2021-06-12 NOTE — PROGRESS NOTES
"Psychology Progress Note    Date: 10/16/2020    Time length and type of treatment: 8726-1061 , individual therapy, telephone visit    Necessity:  This session is necessary to address adjustment issues with features of depression secondary to caregiver role for her  with Alzheimer's.  Today we focus on the patient's depression and anxiety treatment plan, specifically exploring thoughts and expectations of self and others and processing grief.  The reader is invited to review the patient's full treatment plan in the Media section of the patient's Epic medical record.    After review of the patient's situation, this visit was changed from an in-person visit to a telephone visit via "[employer]  to reduce the risk of COVID 19 exposure.  Patient was given the option of a video visit.  Patient declined indicating preference for phone session.  Patient was informed that policies and procedures that govern in-person sessions would also apply to telephone sessions.  Patient was also informed that telephone sessions would be discontinued when COVID 19 exposure is no longer a concern (as determined by [facility]).       Patient distance location: home  Provider distance location: [facility] [place]/[facility]     Patient was in agreement with proceeding with a telephone session.    Intervention: \"I feel a little lower\".  Patient reports that  gets mildly more agitated when patient leaves for a walk.  \"It feels like a ball and chain.\"  Patient reports that it is unpredictable when he becomes distressed that she is gone.    Patient describes that \"uncertainty\" is much worse for her.  \"Life is just pure uncertainty\".  \"A mountain of uncertainty\".  Patient reports trying to walk every day and do zoom yoga sessions to manage anxiety.      Patient reports making a connection with a care facility that is not a nursing home and would accept MA spend down.  We discuss " trying to research other facilities.  We discuss the idea of placing her 's name on waiting lists.  We discuss how this will help managing uncertainty.    Patient reports trying to sleep in the basement. Patient wants to experiment before making changes in the home.    Patient reports that she has [employer] insurance.  This writer confirms that this writer would be out-of-network.   Patient reports that she will research what her options are.  We discuss this as another way of managing uncertainty.    This writer utilized motivational interviewing, active listening, reassurance and support in the context of cognitive behavioral therapy and ACT therapy to address the above.      Mental Status: Orientation to person, place, and time is in tact.  Recent and remote memory, attention, concentration, language, and fund of knowledge are intact.  Mood appears stable with anxious affect.  No indication of suicidal ideation, plan, or intent.  No indication of homicidal ideation, plan or intent.    Progress: Patient reports feeling a little more discouraged.  Progress is good with maintaining stable mood.    Plan: Patient will return in 2 weeks where we will continue with cognitive behavioral therapy to promote adaptive coping with her caregiver role for her  with Alzheimer's disease and support as the patient faces difficult decisions regarding her 's care.  Estimated duration of treatment is 5 individual therapy sessions (96890) at 2 week intervals.  Treatment is expected to be completed by 12/31/2020.  Further services are warranted to address caregiver stress which research has indicated is related to development of psychiatric and medical complications.  Patient was in agreement with this plan.    Diagnosis:  Adjustment disorder with depressed and anxious mood

## 2021-06-25 NOTE — PROGRESS NOTES
"Psychology Progress Note    Date: 6/2/2021    Time length and type of treatment: 4505-0860 , individual therapy, telephone visit    Necessity: This session is necessary to address adjustment issues with features of depression and anxiety secondary to caregiver role for her  with Alzheimer's.  Patient was last seen 10/30/2020.  We did not update the patient's treatment plan in order to establish current goals.      After review of the patient's situation, this visit was changed from an in-person visit to a telephone visit via [employer]  to reduce the risk of COVID 19 exposure.  Patient was given the option of a video visit.  Patient declined indicating preference for phone session.  Patient was informed that policies and procedures that govern in-person sessions would also apply to telephone sessions.  Patient was also informed that telephone sessions would be discontinued when COVID 19 exposure is no longer a concern (as determined by [facility]).       Patient distance location: home  Provider distance location: [facility] [place]    Patient was in agreement with proceeding with a telephone session.    Intervention: Patient reports that her  is now in [facility].  She describes his placement as a challenge.  \"He is not happy.\"  Patient reports knowing that \"he needs to be where he needs to be.\"  Patient describes guilt at having placed him.      Patient indicates that at her last visit with him he was \"mellow.\"  However, yesterday her daughter reports that he was upset looking for her.  This writer advises that the patient not visit her  today and wait for him to settle back into his routine at the facility.  We explore how it is possible that the patient's  might be behavioral with family and cooperative with staff.    Patient describes the winter in [place] not turning out according her to expectations.  She reports that her  was behaviorally " "challenging and she had far less support than she expected.  This contributed to her decision to place him.      Patient indicates that the staff report that her  is cooperative.  We discuss how important this is.  This writer advises that the patient visit her  when she is able to bring another person with her.      Patient acknowledges not yet establishing a routine now that she is living alone.  \"I need assignments.\"  Patient reports seeing friends on a regular basis.  Patient report that \"June is me month.\"      Patient reports that it is \"affirming\" to realize that she would not be able to manage his ADL's.      This writer utilized motivational interviewing, active listening, reassurance and support in the context of cognitive behavioral therapy and ACT therapy to address the above.      Mental Status: Orientation to person, place, and time is in tact.  Recent and remote memory, attention, concentration, language, and fund of knowledge are intact.  Mood appears stable with sad affect.  No indication of suicidal ideation, plan, or intent.  No indication of homicidal ideation, plan or intent.    Progress: Patient reports feeling guilt about placing her  in [facility].  Progress is disrupted by adjusting to placing her  in [facility].    Plan: We will continue with cognitive behavioral therapy on an as needed basis to promote adaptive coping with the care giver role.  Treatment is expected to be on going on a periodic basis for booster sessions.    Diagnosis:  Adjustment disorder with depressed and anxious mood  "

## 2021-06-27 ENCOUNTER — HEALTH MAINTENANCE LETTER (OUTPATIENT)
Age: 71
End: 2021-06-27

## 2021-08-03 ENCOUNTER — VIRTUAL VISIT (OUTPATIENT)
Dept: NEUROLOGY | Facility: CLINIC | Age: 71
End: 2021-08-03
Payer: COMMERCIAL

## 2021-08-03 DIAGNOSIS — F43.23 ADJUSTMENT DISORDER WITH MIXED ANXIETY AND DEPRESSED MOOD: Primary | ICD-10-CM

## 2021-08-03 PROCEDURE — 90834 PSYTX W PT 45 MINUTES: CPT | Mod: 95 | Performed by: PSYCHOLOGIST

## 2021-08-03 NOTE — LETTER
8/3/2021         RE: Melissa Alaniz  [address]  [place] [address]        Dear Colleague,    Thank you for referring your patient, Melissa Alaniz, to the [facility]. Please see a copy of my visit note below.    Psychology Progress Note    Date: 8/3/2021    Time length and type of treatment: 1149-6163 , individual therapy, telephone visit    Necessity: This session is necessary to address adjustment issues with features of depression and anxiety secondary to caregiver role for her  with Alzheimer's.  We update the patient's treatment plan today.  She rates her anxiety as 1  indicating mild anxiety.  This is the same as the  previous rating of 1, mild anxiety. Patient rates her depression on the PHQ-9 scale as 2 indicating mild depression.  This is comparable to the patient's previous rating of mild depression. Patient gives verbal consent to her treatment plan which we discuss.  Verbal consent is in lieu of a signature secondary to a virtual visit.  Today we focus on the patient's depression and anxiety treatment plan, specifically exploring thoughts and expectations of self and others and processing grief.  The reader is invited to review the patient's full treatment plan in the Media section of the patient's Epic medical record.    After review of the patient's situation, this visit was changed from an in-person visit to a telephone visit via [employer]  to reduce the risk of COVID 19 exposure.  Patient was given the option of a video visit.  Patient declined indicating preference for phone session.  Patient was informed that policies and procedures that govern in-person sessions would also apply to telephone sessions.  Patient was also informed that telephone sessions would be discontinued when COVID 19 exposure is no longer a concern (as determined by [facility]).       Patient distance location: [facility] distance location:   "[facility]    Patient was in agreement with proceeding with a telephone session.    Intervention: \"The last 2 weeks have been insane.\"  Patient reports that her close friend has been visiting.  Patient also reports her daughter and family have been visiting.  \"I am trying to be everything to everybody\".      Patient describes a portrait she painted for her daughter and was disappointed with her daughter's response.  Later, she reports that her daughter was more enthusiastic.  Patient reflects on her expectations and not noticing the ways her daughters show care for her.  Patient describes the ways that her daughters show that they care.    Patient expresses her awareness that she is too much of a performer and not internally motivated.      \"I have a lot of decisions to make.\"  \"I crave people.\"  Patient reflects on how difficult it is to be alone and do things alone.  She reports that she does better with a task or mission or project.  Patient indicates that she doesn't know what she wants to do.  Patient reports that she does see friends on a regular basis.  We discuss painting portraits for friends which would give the patient a goal.      Patient describes her original group home plan of volunteering which is disrupted secondary to COVID.  Patient also describes anger at people who refuse to be vaccinated.    This writer utilized motivational interviewing, active listening, reassurance and support in the context of cognitive behavioral therapy and ACT therapy to address the above.      Mental Status: Orientation to person, place, and time is in tact.  Recent and remote memory, attention, concentration, language, and fund of knowledge are intact.  Mood appears stable with sad affect.  No indication of suicidal ideation, plan, or intent.  No indication of homicidal ideation, plan or intent.    Progress: Patient reports working on figuring out how she wants to spend her time.  " Progress is good with maintaining stable mood.    Plan: Patient will return on an as needed basis to continue cognitive behavioral therapy to maintain stable mood.  Estimated duration of treatment is 2 individual therapy sessions (47163) at 8 week intervals.  Treatment is expected to be completed by 12/31/2021.    Diagnosis:  Adjustment disorder with depressed and anxious mood      Again, thank you for allowing me to participate in the care of your patient.        Sincerely,        Mona Callejas Psy.D, LP

## 2021-08-03 NOTE — PROGRESS NOTES
"Psychology Progress Note    Date: 8/3/2021    Time length and type of treatment: 2831-9006 , individual therapy, telephone visit    Necessity: This session is necessary to address adjustment issues with features of depression and anxiety secondary to caregiver role for her  with Alzheimer's.  We update the patient's treatment plan today.  She rates her anxiety as 1  indicating mild anxiety.  This is the same as the  previous rating of 1, mild anxiety. Patient rates her depression on the PHQ-9 scale as 2 indicating mild depression.  This is comparable to the patient's previous rating of mild depression. Patient gives verbal consent to her treatment plan which we discuss.  Verbal consent is in lieu of a signature secondary to a virtual visit.  Today we focus on the patient's depression and anxiety treatment plan, specifically exploring thoughts and expectations of self and others and processing grief.  The reader is invited to review the patient's full treatment plan in the Media section of the patient's Epic medical record.    After review of the patient's situation, this visit was changed from an in-person visit to a telephone visit via [employer]  to reduce the risk of COVID 19 exposure.  Patient was given the option of a video visit.  Patient declined indicating preference for phone session.  Patient was informed that policies and procedures that govern in-person sessions would also apply to telephone sessions.  Patient was also informed that telephone sessions would be discontinued when COVID 19 exposure is no longer a concern (as determined by [facility]).       Patient distance location: [facility] distance location: [facility]    Patient was in agreement with proceeding with a telephone session.    Intervention: \"The last 2 weeks have been insane.\"  Patient reports that her close friend has been visiting.  Patient also reports her daughter and family have " "been visiting.  \"I am trying to be everything to everybody\".      Patient describes a portrait she painted for her daughter and was disappointed with her daughter's response.  Later, she reports that her daughter was more enthusiastic.  Patient reflects on her expectations and not noticing the ways her daughters show care for her.  Patient describes the ways that her daughters show that they care.    Patient expresses her awareness that she is too much of a performer and not internally motivated.      \"I have a lot of decisions to make.\"  \"I crave people.\"  Patient reflects on how difficult it is to be alone and do things alone.  She reports that she does better with a task or mission or project.  Patient indicates that she doesn't know what she wants to do.  Patient reports that she does see friends on a regular basis.  We discuss painting portraits for friends which would give the patient a goal.      Patient describes her original [facility] plan of volunteering which is disrupted secondary to COVID.  Patient also describes anger at people who refuse to be vaccinated.    This writer utilized motivational interviewing, active listening, reassurance and support in the context of cognitive behavioral therapy and ACT therapy to address the above.      Mental Status: Orientation to person, place, and time is in tact.  Recent and remote memory, attention, concentration, language, and fund of knowledge are intact.  Mood appears stable with sad affect.  No indication of suicidal ideation, plan, or intent.  No indication of homicidal ideation, plan or intent.    Progress: Patient reports working on figuring out how she wants to spend her time.  Progress is good with maintaining stable mood.    Plan: Patient will return on an as needed basis to continue cognitive behavioral therapy to maintain stable mood.  Estimated duration of treatment is 2 individual therapy sessions (90945) at 8 week intervals.  Treatment is expected " to be completed by 12/31/2021.    Diagnosis:  Adjustment disorder with depressed and anxious mood

## 2021-10-17 ENCOUNTER — HEALTH MAINTENANCE LETTER (OUTPATIENT)
Age: 71
End: 2021-10-17

## 2022-07-19 NOTE — PROGRESS NOTES
Initial Psychology Consultation (Standard)    Melissa Alaniz  [id number]  Consult Date: 11/27/2019    Reason for Referral:  The patient is a 69 y.o. year old,  individual who is self-referred via the [employer] for an evaluation of  emotional and behavioral functioning in the context of her caregiver role for her  with Alzheimer's disease.  Collateral information was obtained from a review of the medical record via Care Everywhere.   Patient was informed of the role of psychology services, the foreseeable risks and benefits, the limits of confidentiality, and the responsibilities of a mandated .   The patient agreed to proceed.  Patient reports that her  is aware that she is meeting with this writer today.      History of Presenting problem: The patient reports awareness of changes in her 's cognition beginning in 2013.  Because of her 's history of alcoholism, she describes initial uncertainty as to whether his changing cognition was due to his alcohol use or something else.  She indicates her  was formally diagnosed in 2016.  She indicates that her  is able to be at home without supervision for several hours.  She reports that she has to supervise most activities of daily living including reminding him to shower and brush his teeth.  Patient reports that she lays out his clothes for him.  He is no longer driving.  The patient's goal is to enroll her  in an adult day program 4 days a week.  Her  will be beginning an adult day program twice per month.  He is currently on a waiting list for 2 other programs which would achieve the 4-day a week plan.  Patient also reports that following an episode about 2 months ago in which her  displayed more aggressive behavior that he is now under the treatment of a psychiatrist who is assisting with prescribing medications to maintain behavioral stability.  Patient reports that she has  Yes please   "participated in classes offered by the [employer].  The patient and her  enjoy  a group once a month that formed from a memory club in which they participated.  Patient reports that she does not currently participate in a caregiver support group.  She is considering this.    Patient reports that her  and she have been  for nearly 37 years.  She describes a very difficult marriage complicated by her 's alcoholism (sober since 2013) and generally angry and negative personality.  Patient acknowledges residual resentment regarding her 's alcohol use as well as checkered work history throughout their  marriage.  Patient reports considering several times during their marriage obtaining a divorce.  She currently acknowledges some regret that she did not divorce her .  Currently, patient reports that her  is \"very dependent on me.  I am his new bottle of vodka\".  Patient wonders \"how can I have a life.\"  Patient wonders if her  refuses to participate in an adult program what choices she has.  She states \"I cannot make him go if he will not go\".    Patient reports chronic anxiety which is lifelong.  She also endorses sad and discouraged mood.  She indicates she is able to enjoy activities with friends and her usual interests.  She denies suicidal ideation.  She acknowledges periodic irritability.  She acknowledges \"arguing\" with her  when she knows she is right even though she is very aware that this is a futile strategy.    No standardized assessment tools were administered.    No non-personal contextual factors are reported.    Social History: Patient reports being born and raised in [place], [place].  She is the second of 5 children.  Patient achieved a bachelor's degree of science in art education at the [employer].  She currently lives in her own home with her  in [place].  Patient is retired from working for the " "[employer] as an  and .  Patient has 2 adult children who are actively involved in her life.  1 of her children lives in [place].  Patient reports a good system of support from neighbors and friends.  Patient indicates that her spiritual beliefs and participation in her Mandaeism community are helpful and supportive.  She acknowledges the loss of a Mandaeism community that was very important to her and to her  when they moved to [place].  Patient reports no history of abuse.  She achieved developmental milestones in the expected fashion.  Patient reports no economic concerns.    Medical History: Patient describes her general physical health is \"pretty good\".  She acknowledges chronic worry about recurrence of cancer.  The patient does not report cultural factors impacting pursuit of care.  The patient pursues standard medical care.  Patient's history is significant for ovarian cancer, hypertension, dyslipidemia, diverticulosis, chronic kidney disease, osteoarthritis, tinnitus, chronic low back pain.  Family history is significant for her father with Parkinson's disease, and her mother with breast cancer, diabetes, and Parkinson's disease.    Medications include: Simvastatin, Maxzide, Cozaar.    Psychiatric History: As indicated above, patient endorses a history of chronic anxiety since her 20s.  She has no history of psychiatric hospitalization.  Current medications include amitriptyline, Lexapro, Restoril.  Patient participated in marital counseling with her  for 10 years ([id number]) and found it helpful.  Patient participated in individual counseling briefly in 1980.  Patient reports no history significant for chemical abuse or dependency.  Patient reports family history significant for her father with anxiety and her mother with depression, her brother and nephews with anxiety.  Patient reports no family history significant for chemical abuse or " dependency.  A CAGE Questionnaire was not administered secondary to absence of reported chemical use history.    Mental Status Exam:    Grooming:  Well-groomed    Attire:  Appropriate    Age:  appears stated age    Attitude during interview: friendly    Participation: cooperative     Motor activity:  Within normal limits    Eye contact:  appropriate    Mood:  Sad    Affect:  Congruent with content of speech    Speech/language:  Within normal limits    Attention:  Within normal limits    Concentration:  within normal limits    Thought Process:  Within normal limits    Thought Content:  Within normal limits    Orientation:  Orientated to person time and place    Memory:  No evidence of impairment.    Judgement: No evidence of impairment    Estimated intelligence:  high    Demonstrated insight:  normal    Fund of Knowledge: Good      Clinical Summary: Patient is a 69-year-old,  individual who is self-referred for support with adjustment issues associated with her caregiver role for her  with Alzheimer's.  Patient indicates an awareness that her  was having cognitive difficulty beginning in 2013.  Currently, her  requires supervision with activities of daily living including reminders for hygiene activities.  However, her  is able to be by himself for several hours.  Patient's goal is to have her  participate in adult day program 4 days a week (Tuesday through Friday).  Patient and her  take care of their grandchildren on Monday.    Complicating the patient's caregiver role is a marital history that was difficult and challenging.  Patient describes her  as an alcoholic (sober since 2013) with an angry and negative personality.  Patient also reports that her 's work history was checkered.  Patient indicates she considered divorce several times during their marriage and opted not to pursue it.  She currently describes some regrets about not  him.   Patient acknowledges feelings of resentment at times with her caregiver role.    Patient endorses chronic anxiety and sad and depressed mood.  She denies suicidal ideation, hopelessness, worthlessness, and helplessness.    Patient strengths include excellent insight, excellent support from friends and family.    Diagnosis: Adjustment disorder with depressed and anxious mood; consider major depressive disorder with anxious distress      Plan: Patient will return in 1 month where we will continue with cognitive behavioral therapy to promote adaptive coping with her caregiver role for her  with Alzheimer's disease and support as the patient faces difficult decisions regarding her 's care.  This writer recommended books by Katja Bingham about ambiguous loss and complicated grieving.  Estimated duration of treatment is 4 individual therapy sessions (33071) at 2-week intervals.   it is anticipated that frequency will be reduced to 1 time per month for 4 sessions by 4/1/2024.  Treatment will be completed by 8/1/2020.  Further services are warranted to address caregiver stress which research has indicated is related to development of psychiatric and medical complications.  Patient was in agreement with this plan.

## 2022-07-24 ENCOUNTER — HEALTH MAINTENANCE LETTER (OUTPATIENT)
Age: 72
End: 2022-07-24

## 2022-10-02 ENCOUNTER — HEALTH MAINTENANCE LETTER (OUTPATIENT)
Age: 72
End: 2022-10-02

## 2023-08-12 ENCOUNTER — HEALTH MAINTENANCE LETTER (OUTPATIENT)
Age: 73
End: 2023-08-12